# Patient Record
Sex: MALE | Race: WHITE | NOT HISPANIC OR LATINO | URBAN - NONMETROPOLITAN AREA
[De-identification: names, ages, dates, MRNs, and addresses within clinical notes are randomized per-mention and may not be internally consistent; named-entity substitution may affect disease eponyms.]

---

## 2024-03-19 ENCOUNTER — OFFICE (OUTPATIENT)
Dept: URBAN - NONMETROPOLITAN AREA CLINIC 1 | Facility: CLINIC | Age: 54
End: 2024-03-19
Payer: COMMERCIAL

## 2024-03-19 VITALS
DIASTOLIC BLOOD PRESSURE: 78 MMHG | HEIGHT: 69 IN | HEART RATE: 103 BPM | WEIGHT: 259 LBS | SYSTOLIC BLOOD PRESSURE: 119 MMHG

## 2024-03-19 DIAGNOSIS — G47.30 SLEEP APNEA, UNSPECIFIED: ICD-10-CM

## 2024-03-19 DIAGNOSIS — Z80.0 FAMILY HISTORY OF MALIGNANT NEOPLASM OF DIGESTIVE ORGANS: ICD-10-CM

## 2024-03-19 DIAGNOSIS — K21.9 GASTRO-ESOPHAGEAL REFLUX DISEASE WITHOUT ESOPHAGITIS: ICD-10-CM

## 2024-03-19 DIAGNOSIS — I10 ESSENTIAL (PRIMARY) HYPERTENSION: ICD-10-CM

## 2024-03-19 PROCEDURE — 99203 OFFICE O/P NEW LOW 30 MIN: CPT | Performed by: NURSE PRACTITIONER

## 2024-03-19 NOTE — SERVICENOTES
Continue PPI daily.  
His next colonoscopy will be due 2026.  I will be happy to see him again in the meanwhile, as needed

## 2024-03-19 NOTE — SERVICEHPINOTES
Mr. De Los Santos says he was referred to us from his PCP, to schedule a colonoscopy.  He has a family history of colon cancer with his dad.  He has normal bowel movements.  No abdominal pain or blood with his stools.  He has a good appetite and has not had unexpected weight loss.   He takes omeprazole 40 mg daily for chronic GERD, and rarely has breakthrough heartburn or nausea.   His next colonoscopy is due 03/31/2026.    His chronic illnesses include sleep apnea, hypertension, GERD, and chronic back pain.br


br EGD/ colonoscopy 3/31/21 by Dr. Ruiz-
br Findings:brEsophagus: Z-line at 40 centimeters. Normal esophagus.	brStomach: Several superficial gastric ulcers in the gastric body and antrum which were all clean based ([facility] Class III). Biopsies were taken to rule out H pylori and for histology. Stomach otherwise normal on direct and retroflexion view.brPylorus: NormalbrDuodenum: Examined duodenum normal.br
brColon findings:br4 millimeter sessile cecal polyp removed with cold snare. Resection was complete.brTwo sessile sigmoid colon polyps ranging in size from 4 millimeter to 5 millimeter removed with cold snare. Resection was complete.br6 millimeter sessile rectal polyp removed with cold snare. Resection was complete.brScattered transverse and cecal diverticula.brGrade III nonbleeding internal hemorrhoids found during retroflexion.brColon otherwise normal on direct and retroflexion view.brEstimated blood loss: NonebrUnplanned events: There were no unplanned events.brRecommendations: brFollow-up pathology.brRepeat colonoscopy determined by pathology results. If the patient's pathology comes back as benign he will have colonoscopy every 5 years given family history of colon cancer in his brother and father.br   
br Final Pathologic Diagnosis:br   1.   STOMACH, BIOPSIES:br     Benign gastric mucosa with reactive (chemical) gastropathy.br   Negative for intestinal metaplasia and significant atrophy.br   No Helicobacter-like organisms identified by immunostain. br   2.   CECAL POLYP, ENDOSCOPIC BIOPSY:br     Benign colonic mucosa with focal mucosal prolapse-type changes.br   3.   SIGMOID COLON, POLYPS, ENDOSCOPIC BIOPSIES:br     Benign colonic mucosa with mucosal prolapse-type changes.br   4.   RECTAL POLYP, ENDOSCOPIC BIOPSY:br     Benign colonic mucosa with mucosal prolapse-type changes.